# Patient Record
Sex: FEMALE | Race: BLACK OR AFRICAN AMERICAN | NOT HISPANIC OR LATINO | Employment: FULL TIME | URBAN - METROPOLITAN AREA
[De-identification: names, ages, dates, MRNs, and addresses within clinical notes are randomized per-mention and may not be internally consistent; named-entity substitution may affect disease eponyms.]

---

## 2023-11-27 ENCOUNTER — HOSPITAL ENCOUNTER (EMERGENCY)
Facility: HOSPITAL | Age: 33
Discharge: HOME | End: 2023-11-27
Attending: EMERGENCY MEDICINE
Payer: MEDICAID

## 2023-11-27 VITALS
DIASTOLIC BLOOD PRESSURE: 80 MMHG | BODY MASS INDEX: 31.75 KG/M2 | RESPIRATION RATE: 18 BRPM | HEART RATE: 104 BPM | WEIGHT: 197.56 LBS | SYSTOLIC BLOOD PRESSURE: 140 MMHG | OXYGEN SATURATION: 100 % | HEIGHT: 66 IN

## 2023-11-27 DIAGNOSIS — M54.50 ACUTE BILATERAL LOW BACK PAIN WITHOUT SCIATICA: Primary | ICD-10-CM

## 2023-11-27 DIAGNOSIS — Z34.90 PREGNANCY, UNSPECIFIED GESTATIONAL AGE: ICD-10-CM

## 2023-11-27 LAB
B-HCG UR QL: POSITIVE
BILIRUB UR QL STRIP: NEGATIVE
CLARITY UR: CLEAR
COLOR UR: YELLOW
GLUCOSE UR QL STRIP: NEGATIVE
HCG INTACT+B SERPL-ACNC: 4377 MIU/ML
HGB UR QL STRIP: NEGATIVE
KETONES UR QL STRIP: NEGATIVE
LEUKOCYTE ESTERASE UR QL STRIP: NEGATIVE
NITRITE UR QL STRIP: NEGATIVE
PH UR STRIP: 7 [PH] (ref 5–8)
PROT UR QL STRIP: NEGATIVE
SP GR UR STRIP: 1.01 (ref 1–1.03)
URN SPEC COLLECT METH UR: NORMAL
UROBILINOGEN UR STRIP-ACNC: NEGATIVE EU/DL

## 2023-11-27 PROCEDURE — 99283 EMERGENCY DEPT VISIT LOW MDM: CPT

## 2023-11-27 PROCEDURE — 84702 CHORIONIC GONADOTROPIN TEST: CPT | Performed by: NURSE PRACTITIONER

## 2023-11-27 PROCEDURE — 81025 URINE PREGNANCY TEST: CPT | Performed by: NURSE PRACTITIONER

## 2023-11-27 PROCEDURE — 81003 URINALYSIS AUTO W/O SCOPE: CPT | Performed by: NURSE PRACTITIONER

## 2023-11-27 RX ORDER — CYCLOBENZAPRINE HCL 5 MG
5 TABLET ORAL 3 TIMES DAILY PRN
Qty: 15 TABLET | Refills: 0 | Status: SHIPPED | OUTPATIENT
Start: 2023-11-27 | End: 2023-12-02

## 2023-11-28 NOTE — ED PROVIDER NOTES
HISTORY     Chief Complaint   Patient presents with    Back Pain     Pt c/o cramping to both sides of lower back. Pt reports that she took a pregnancy test today and was +, states that she had vaginal bleeding last week.      Review of patient's allergies indicates:  No Known Allergies     HPI   The history is provided by the patient.   Back Pain   This is a new problem. The current episode started several days ago. The problem occurs throughout the day. The problem has been waxing and waning. The pain is associated with no known injury. The pain is present in the lumbar spine. The quality of the pain is described as aching. The pain does not radiate. The pain is at a severity of 6/10. The symptoms are aggravated by bending, twisting and certain positions. Pertinent negatives include no chest pain, no fever, no dysuria and no weakness. She has tried nothing for the symptoms. The treatment provided no relief. Risk factors include pregnancy.        PCP: No, Primary Doctor     Past Medical History:  No past medical history on file.     Past Surgical History:  No past surgical history on file.     Family History:  No family history on file.     Social History:  Social History     Tobacco Use    Smoking status: Not on file    Smokeless tobacco: Not on file   Substance and Sexual Activity    Alcohol use: Not on file    Drug use: Not on file    Sexual activity: Not on file         ROS   Review of Systems   Constitutional:  Negative for fever.   HENT:  Negative for sore throat.    Respiratory:  Negative for shortness of breath.    Cardiovascular:  Negative for chest pain.   Gastrointestinal:  Negative for nausea.   Genitourinary:  Negative for dysuria.   Musculoskeletal:  Positive for back pain.   Skin:  Negative for rash.   Neurological:  Negative for weakness.   Hematological:  Does not bruise/bleed easily.       PHYSICAL EXAM     Initial Vitals [11/27/23 1827]   BP Pulse Resp Temp SpO2   (!) 140/80 104 18 -- 100 %  "     MAP       --           Physical Exam    Constitutional: She appears well-developed and well-nourished. No distress.   HENT:   Head: Normocephalic and atraumatic.   Eyes: Conjunctivae are normal. Pupils are equal, round, and reactive to light.   Neck: Neck supple.   Normal range of motion.  Cardiovascular:  Normal rate, regular rhythm and normal heart sounds.           Pulmonary/Chest: Breath sounds normal.   Abdominal: Abdomen is soft. Bowel sounds are normal. She exhibits no distension. There is no abdominal tenderness. There is no rebound.   Musculoskeletal:         General: No edema. Normal range of motion.      Cervical back: Normal range of motion and neck supple.     Neurological: She is alert and oriented to person, place, and time. She has normal strength.   Skin: Skin is warm and dry.   Psychiatric: She has a normal mood and affect.          ED COURSE   Procedures  ED ONGOING VITALS:  Vitals:    11/27/23 1827   BP: (!) 140/80   Pulse: 104   Resp: 18   SpO2: 100%   Weight: 89.6 kg (197 lb 8.5 oz)   Height: 5' 6" (1.676 m)         ABNORMAL LAB VALUES:  Labs Reviewed   PREGNANCY TEST, URINE RAPID - Abnormal; Notable for the following components:       Result Value    Preg Test, Ur Positive (*)     All other components within normal limits    Narrative:     Specimen Source->Urine   URINALYSIS, REFLEX TO URINE CULTURE    Narrative:     Specimen Source->Urine   HCG, QUANTITATIVE    Narrative:     Release to patient->Immediate         ALL LAB VALUES:  Results for orders placed or performed during the hospital encounter of 11/27/23   Urinalysis, Reflex to Urine Culture Urine, Clean Catch    Specimen: Urine   Result Value Ref Range    Specimen UA Urine, Clean Catch     Color, UA Yellow Yellow, Straw, Elidia    Appearance, UA Clear Clear    pH, UA 7.0 5.0 - 8.0    Specific Gravity, UA 1.015 1.005 - 1.030    Protein, UA Negative Negative    Glucose, UA Negative Negative    Ketones, UA Negative Negative    " Bilirubin (UA) Negative Negative    Occult Blood UA Negative Negative    Nitrite, UA Negative Negative    Urobilinogen, UA Negative <2.0 EU/dL    Leukocytes, UA Negative Negative   Rapid Pregnancy, Urine   Result Value Ref Range    Preg Test, Ur Positive (A)    hCG, quantitative, pregnancy   Result Value Ref Range    HCG Quant 4377 See Text mIU/mL           RADIOLOGY STUDIES:  Imaging Results    None                   The above vital signs and test results have been reviewed by the emergency provider.     ED Medications:  Discharge Medication List as of 11/27/2023  9:23 PM        START taking these medications    Details   cyclobenzaprine (FLEXERIL) 5 MG tablet Take 1 tablet (5 mg total) by mouth 3 (three) times daily as needed., Starting Mon 11/27/2023, Until Sat 12/2/2023 at 2359, Print           Discharge Medications:  Discharge Medication List as of 11/27/2023  9:23 PM        START taking these medications    Details   cyclobenzaprine (FLEXERIL) 5 MG tablet Take 1 tablet (5 mg total) by mouth 3 (three) times daily as needed., Starting Mon 11/27/2023, Until Sat 12/2/2023 at 2359, Print             Follow-up Information       Schedule an appointment as soon as possible for a visit  with PCP.               Schedule an appointment as soon as possible for a visit  with [facility].    Specialty: Obstetrics and Gynecology  Contact information:  [address] [place] [id number] [phone]  Additional information:  Please take [address] for the Medical Office Building. Check in on the 3rd floor, to the right.                          I discussed with patient and/or family/caretaker that evaluation in the ED does not suggest any emergent or life threatening medical conditions requiring immediate intervention beyond what was provided in the ED, and I believe patient is safe for discharge. Regardless, an unremarkable evaluation in the ED does not preclude the development or presence of a  serious or life threatening condition. As such, patient was instructed to return immediately for any worsening or change in current symptoms.    Patient understands the risk and benefits of taking any medications during pregnancy and the affect it may have on fetus. Patient understands risk. Patient also understands the need to f/u with OB to discuss new RX medications.     Regarding POSITIVE PREGNANCY TEST, patient was educated on antepartum period including:  normal pregnancy lasts about 40 weeks; first trimester lasts from the last period through the 12th week of pregnancy; the second trimester lasts from the 13th week of pregnancy through the 23rd week; and the third trimester lasts from the 24th week of pregnancy until delivery.  Advised patient to seek care immediately if she:  develops a severe headache that does not go away; has new or increased vision changes (i.e.,  blurred or spotted vision);  has new or increased swelling in the face or hands; has pain or cramping in abdomen or lower back; or has vaginal bleeding.  Recommended that patient contact primary care provider or OB/GYN if she: has abdominal cramps, pressure, or tightening; has a change in vaginal discharge; cannot keep food or drinks down, and begin losing weight; develop chills or a fever; experience vaginal itching, burning, or pain; notice yellow, green, white, or foul-smelling vaginal discharge; has pain or burning with urination, less urine than usual, or pink or bloody urine; or has any questions or concerns about  pregnancy.  Patient was provided tips to staying healthy during pregnancy including: eating a variety of healthy foods (i.e., including fruits, vegetables, whole-grain breads, low-fat dairy foods, beans, lean meats, and fish low in mercury); drinking adequate amounts of liquids as directed; taking prenatal vitamins as directed; gaining appropriate amount of weight that is considered healthy for both mom and fetus; participates  in moderate exercise; quits or refrains from smoking; avoids alcohol use; and talks to healthcare provider or pharmacist before  taking  any medicines.  Patient was advised to avoid hot tubs and saunas, toxoplasmosis (raw meat and infected cat feces), and traveling during the 3rd trimester. Reiterated to the patient the importance of receiving prenatal care and being compliant with all medications and treatment plans recommended by OB/GYN.         MEDICAL DECISION MAKING          Patietnt to return in 2-3 days for repeat HCG.        CLINICAL IMPRESSION       ICD-10-CM ICD-9-CM   1. Acute bilateral low back pain without sciatica  M54.50 724.2     338.19   2. Pregnancy, unspecified gestational age  Z34.90 V22.2       Disposition:   Disposition: Discharged  Condition: Stable         Tiburcio Butler NP  11/28/23 2933

## 2023-11-28 NOTE — FIRST PROVIDER EVALUATION
"Medical screening examination initiated.  I have conducted a focused provider triage encounter, findings are as follows:    Brief history of present illness:  33-year-old female presents to the emergency room with lower back pain.  Patient states she had a positive pregnancy test.    Vitals:    11/27/23 1827   BP: (!) 140/80   Pulse: 104   Resp: 18   SpO2: 100%   Weight: 89.6 kg (197 lb 8.5 oz)   Height: 5' 6" (1.676 m)       Pertinent physical exam:  Vital signs stable no acute distress speaking in full sentences    Brief workup plan:  Urinalysis beta HCG    Preliminary workup initiated; this workup will be continued and followed by the physician or advanced practice provider that is assigned to the patient when roomed.  "